# Patient Record
Sex: FEMALE | Race: WHITE | NOT HISPANIC OR LATINO | Employment: FULL TIME | URBAN - METROPOLITAN AREA
[De-identification: names, ages, dates, MRNs, and addresses within clinical notes are randomized per-mention and may not be internally consistent; named-entity substitution may affect disease eponyms.]

---

## 2017-03-17 ENCOUNTER — TELEPHONE (OUTPATIENT)
Dept: FAMILY MEDICINE CLINIC | Facility: CLINIC | Age: 62
End: 2017-03-17

## 2017-03-17 NOTE — TELEPHONE ENCOUNTER
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

## 2017-10-10 ENCOUNTER — APPOINTMENT (OUTPATIENT)
Dept: WOMENS IMAGING | Facility: HOSPITAL | Age: 62
End: 2017-10-10

## 2017-10-10 PROCEDURE — 77063 BREAST TOMOSYNTHESIS BI: CPT | Performed by: RADIOLOGY

## 2017-10-10 PROCEDURE — 77067 SCR MAMMO BI INCL CAD: CPT | Performed by: RADIOLOGY

## 2018-01-31 ENCOUNTER — OFFICE VISIT (OUTPATIENT)
Dept: FAMILY MEDICINE CLINIC | Facility: CLINIC | Age: 63
End: 2018-01-31

## 2018-01-31 VITALS
HEIGHT: 63 IN | WEIGHT: 107 LBS | BODY MASS INDEX: 18.96 KG/M2 | OXYGEN SATURATION: 97 % | TEMPERATURE: 98.2 F | SYSTOLIC BLOOD PRESSURE: 92 MMHG | DIASTOLIC BLOOD PRESSURE: 62 MMHG | HEART RATE: 78 BPM

## 2018-01-31 DIAGNOSIS — M25.561 ACUTE PAIN OF RIGHT KNEE: Primary | ICD-10-CM

## 2018-01-31 PROCEDURE — 99213 OFFICE O/P EST LOW 20 MIN: CPT | Performed by: FAMILY MEDICINE

## 2018-01-31 PROCEDURE — 96372 THER/PROPH/DIAG INJ SC/IM: CPT | Performed by: FAMILY MEDICINE

## 2018-01-31 RX ORDER — DEXAMETHASONE SODIUM PHOSPHATE 4 MG/ML
4 INJECTION, SOLUTION INTRA-ARTICULAR; INTRALESIONAL; INTRAMUSCULAR; INTRAVENOUS; SOFT TISSUE ONCE
Status: COMPLETED | OUTPATIENT
Start: 2018-01-31 | End: 2018-01-31

## 2018-01-31 RX ORDER — BUDESONIDE AND FORMOTEROL FUMARATE DIHYDRATE 160; 4.5 UG/1; UG/1
2 AEROSOL RESPIRATORY (INHALATION)
Qty: 10.2 G | Refills: 5 | Status: SHIPPED | OUTPATIENT
Start: 2018-01-31 | End: 2019-02-19 | Stop reason: SDUPTHER

## 2018-01-31 RX ADMIN — DEXAMETHASONE SODIUM PHOSPHATE 4 MG: 4 INJECTION, SOLUTION INTRA-ARTICULAR; INTRALESIONAL; INTRAMUSCULAR; INTRAVENOUS; SOFT TISSUE at 13:39

## 2018-01-31 NOTE — PATIENT INSTRUCTIONS
This is a very nice 62-year-old who has acute right knee pain.  I will request an MRI and notify her when the results are available.  I would like her to call if there is a problem.
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
## 2018-02-05 ENCOUNTER — HOSPITAL ENCOUNTER (OUTPATIENT)
Dept: MRI IMAGING | Facility: HOSPITAL | Age: 63
Discharge: HOME | End: 2018-02-05
Admitting: FAMILY MEDICINE

## 2018-02-05 PROCEDURE — 73721 MRI JNT OF LWR EXTRE W/O DYE: CPT

## 2018-02-08 DIAGNOSIS — S83.206A ACUTE MENISCAL TEAR OF RIGHT KNEE, INITIAL ENCOUNTER: Primary | ICD-10-CM

## 2018-02-08 DIAGNOSIS — R93.7 BONE MARROW EDEMA: ICD-10-CM

## 2018-02-08 PROBLEM — S83.209A ACUTE MENISCAL TEAR OF KNEE: Status: ACTIVE | Noted: 2018-02-08

## 2018-02-13 ENCOUNTER — OFFICE VISIT (OUTPATIENT)
Dept: ORTHOPEDIC SURGERY | Facility: CLINIC | Age: 63
End: 2018-02-13

## 2018-02-13 VITALS — TEMPERATURE: 98.6 F | BODY MASS INDEX: 19.69 KG/M2 | HEIGHT: 62 IN | WEIGHT: 107 LBS

## 2018-02-13 DIAGNOSIS — M25.561 ACUTE PAIN OF RIGHT KNEE: Primary | ICD-10-CM

## 2018-02-13 PROCEDURE — 20610 DRAIN/INJ JOINT/BURSA W/O US: CPT | Performed by: NURSE PRACTITIONER

## 2018-02-13 PROCEDURE — 73562 X-RAY EXAM OF KNEE 3: CPT | Performed by: NURSE PRACTITIONER

## 2018-02-13 PROCEDURE — 99203 OFFICE O/P NEW LOW 30 MIN: CPT | Performed by: NURSE PRACTITIONER

## 2018-02-13 RX ORDER — BUPIVACAINE HYDROCHLORIDE 5 MG/ML
2 INJECTION, SOLUTION EPIDURAL; INTRACAUDAL
Status: COMPLETED | OUTPATIENT
Start: 2018-02-13 | End: 2018-02-13

## 2018-02-13 RX ORDER — METHYLPREDNISOLONE ACETATE 80 MG/ML
80 INJECTION, SUSPENSION INTRA-ARTICULAR; INTRALESIONAL; INTRAMUSCULAR; SOFT TISSUE
Status: COMPLETED | OUTPATIENT
Start: 2018-02-13 | End: 2018-02-13

## 2018-02-13 RX ADMIN — METHYLPREDNISOLONE ACETATE 80 MG: 80 INJECTION, SUSPENSION INTRA-ARTICULAR; INTRALESIONAL; INTRAMUSCULAR; SOFT TISSUE at 15:56

## 2018-02-13 RX ADMIN — BUPIVACAINE HYDROCHLORIDE 2 ML: 5 INJECTION, SOLUTION EPIDURAL; INTRACAUDAL at 15:56
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
## 2018-02-15 ENCOUNTER — TELEPHONE (OUTPATIENT)
Dept: ORTHOPEDIC SURGERY | Facility: CLINIC | Age: 63
End: 2018-02-15

## 2018-02-15 NOTE — TELEPHONE ENCOUNTER
Would recommend continued ice, elevation, NSAIDS/Tylenol prn--follow up if it doesn't improve over the next several days

## 2018-02-16 ENCOUNTER — HOSPITAL ENCOUNTER (OUTPATIENT)
Dept: PHYSICAL THERAPY | Facility: HOSPITAL | Age: 63
Setting detail: THERAPIES SERIES
Discharge: HOME | End: 2018-02-16

## 2018-02-16 DIAGNOSIS — Z74.09 IMPAIRED MOBILITY: ICD-10-CM

## 2018-02-16 DIAGNOSIS — M25.561 RIGHT KNEE PAIN, UNSPECIFIED CHRONICITY: Primary | ICD-10-CM

## 2018-02-16 PROCEDURE — 97110 THERAPEUTIC EXERCISES: CPT | Performed by: PHYSICAL THERAPIST

## 2018-02-16 PROCEDURE — 97161 PT EVAL LOW COMPLEX 20 MIN: CPT | Performed by: PHYSICAL THERAPIST
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
## 2018-02-21 ENCOUNTER — HOSPITAL ENCOUNTER (OUTPATIENT)
Dept: PHYSICAL THERAPY | Facility: HOSPITAL | Age: 63
Setting detail: THERAPIES SERIES
Discharge: HOME | End: 2018-02-21

## 2018-02-21 DIAGNOSIS — M25.561 RIGHT KNEE PAIN, UNSPECIFIED CHRONICITY: Primary | ICD-10-CM

## 2018-02-21 DIAGNOSIS — Z74.09 IMPAIRED MOBILITY: ICD-10-CM

## 2018-02-21 PROCEDURE — 97110 THERAPEUTIC EXERCISES: CPT

## 2018-02-21 NOTE — THERAPY TREATMENT NOTE
Outpatient Physical Therapy Ortho Treatment Note  [facility]     Patient Name: Rafia Taylor  : 1955  MRN: [id number]  Today's Date: 2018      Visit Date: 2018    Visit Dx:    ICD-10-CM ICD-9-CM   1. Right knee pain, unspecified chronicity M25.561 719.46   2. Impaired mobility Z74.09 799.89       Patient Active Problem List   Diagnosis   • Insect bite of neck, infected   • Anxiety   • Carpal tunnel syndrome   • Strain of neck muscle   • Low back pain   • Paresthesia   • Annual physical exam   • Acute pain of right knee   • Acute meniscal tear of knee   • Bone marrow edema        No past medical history on file.     No past surgical history on file.                          PT Assessment/Plan       18 8504       PT Assessment    Assessment Comments Pt returns for initial follow up after evaluation and reports decreased symptoms with HEP. Pt reports increased pain today and antalgia noted with gait. Performed recumbent bike warm up and pt with questions regarding resuming recumbent bike at home. Advised pt to perform for 5 min only and stop if pain begins. May be able to progress pending symptoms with bike and HEP. Added SLR, LAQ, SAQ and hip flexor stretch today which pt was able to perform with light ankle weight and no pain. Pt with many questions regarding returning to normal recreational activities including yoga. Advised pt to avoid any activites which place torque on the knee including several yoga poses. Pt verbalizes understanding.   -CN     PT Plan    PT Plan Comments Assess response to added exercise and progress if able. Consider adding weights to HS curls, assess response to recreational activities.   -CN       User Key  (r) = Recorded By, (t) = Taken By, (c) = Cosigned By    Initials Name Provider Type    DADA Aguirre, PT Physical Therapist                    Exercises       18 1700          Subjective Comments    Subjective Comments It has been  hurting a little more today and I can tell that I have been limping more. The exercises help a lot though. The next day I usually feel better.   -CN      Subjective Pain    Able to rate subjective pain? yes  -CN      Pre-Treatment Pain Level 6  -CN      Exercise 4    Exercise Name 4 quad set  -CN      Cueing 4 Demo  -CN      Reps 4 15  -CN      Time (Seconds) 4 5  -CN      Exercise 9    Exercise Name 9 SLR with quad set  -CN      Cueing 9 Verbal  -CN      Sets 9 2  -CN      Reps 9 10  -CN      Additional Comments 2#  -CN      Exercise 10    Exercise Name 10 Recumbent bike, seat 4  -CN      Cueing 10 Verbal  -CN      Time (Minutes) 10 5  -CN      Exercise 11    Exercise Name 11 SLR with quad set and ER  -CN      Cueing 11 Verbal  -CN      Sets 11 2  -CN      Reps 11 10  -CN      Exercise 12    Exercise Name 12 SAQ  -CN      Cueing 12 Demo  -CN      Sets 12 2  -CN      Reps 12 10  -CN      Additional Comments 2#  -CN      Exercise 13    Exercise Name 13 LAQ with ball squeeze  -CN      Cueing 13 Demo  -CN      Sets 13 2  -CN      Reps 13 10  -CN      Additional Comments 2#  -CN      Exercise 14    Exercise Name 14 Hip flexor stretch at stairs  -CN      Cueing 14 Demo  -CN      Reps 14 3  -CN      Time (Seconds) 14 20  -CN        User Key  (r) = Recorded By, (t) = Taken By, (c) = Cosigned By    Initials Name Provider Type    CN Naida Aguirre, PT Physical Therapist                               PT OP Goals       02/21/18 1800       PT Short Term Goals    STG Date to Achieve 03/16/18  -CN     STG 1 pt. to be I with initial HEP to facilitate self management of their condition  -CN     STG 1 Progress Ongoing  -CN     STG 1 Progress Comments Pt compliant with initial HEP, progressed today.   -CN     STG 2 pt. to be educated in/verbalize understanding of the importance of posture/ergonomics in association with their condition to facilitate self management of their condition  -CN     STG 2 Progress Ongoing  -CN      STG 3 pt to demosntrate near normal heel to toe gait pattern to facilitate ease/safety with household/community mobility.  -CN     STG 3 Progress Ongoing  -CN     STG 3 Progress Comments Pt with antalgia noted during gait today.   -CN     Long Term Goals    LTG Date to Achieve 05/17/18  -CN     LTG 1 pt. to be I with advanced HEP to facilitate self management of their condition  -CN     LTG 1 Progress Ongoing  -CN     LTG 2 pt. to report a KOS score >/= 70 to demonstrate decreased level of perceived disability  -CN     LTG 2 Progress Ongoing  -CN     LTG 3 pt. to ascend/descends stairs with reciprocal pattern (</= 1 rails) to facilitate ease/safety of household/community mobility  -CN     LTG 3 Progress Ongoing  -CN     LTG 4 pt. 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)  -CN     LTG 4 Progress Ongoing  -CN       User Key  (r) = Recorded By, (t) = Taken By, (c) = Cosigned By    Initials Name Provider Type    DADA Aguirre, PT Physical Therapist          Therapy Education  Given: HEP, Symptoms/condition management, Pain management, Posture/body mechanics, Mobility training, Edema management  Program: Reinforced  How Provided: Verbal, Demonstration, Written  Provided to: Patient  Level of Understanding: Teach back education performed, Verbalized, Demonstrated              Time Calculation:   Start Time: 1715  Stop Time: 1800  Time Calculation (min): 45 min    Therapy Charges for Today     Code Description Service Date Service Provider Modifiers Qty    11386658077 HC PT THER PROC EA 15 MIN 2/21/2018 Naida Aguirre, PT GP 3                    Naida Aguirre, PT  2/21/2018

## 2018-02-26 ENCOUNTER — HOSPITAL ENCOUNTER (OUTPATIENT)
Dept: PHYSICAL THERAPY | Facility: HOSPITAL | Age: 63
Setting detail: THERAPIES SERIES
Discharge: HOME | End: 2018-02-26

## 2018-02-26 DIAGNOSIS — M25.561 RIGHT KNEE PAIN, UNSPECIFIED CHRONICITY: Primary | ICD-10-CM

## 2018-02-26 DIAGNOSIS — Z74.09 IMPAIRED MOBILITY: ICD-10-CM

## 2018-02-26 PROCEDURE — 97110 THERAPEUTIC EXERCISES: CPT | Performed by: PHYSICAL THERAPIST
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
## 2018-03-01 ENCOUNTER — HOSPITAL ENCOUNTER (OUTPATIENT)
Dept: PHYSICAL THERAPY | Facility: HOSPITAL | Age: 63
Setting detail: THERAPIES SERIES
Discharge: HOME | End: 2018-03-01

## 2018-03-01 DIAGNOSIS — M25.561 RIGHT KNEE PAIN, UNSPECIFIED CHRONICITY: Primary | ICD-10-CM

## 2018-03-01 DIAGNOSIS — Z74.09 IMPAIRED MOBILITY: ICD-10-CM

## 2018-03-01 PROCEDURE — 97110 THERAPEUTIC EXERCISES: CPT

## 2018-03-01 NOTE — THERAPY TREATMENT NOTE
Outpatient Physical Therapy Ortho Treatment Note  [facility]     Patient Name: Rafia Taylor  : 1955  MRN: [id number]  Today's Date: 3/1/2018      Visit Date: 2018    Visit Dx:    ICD-10-CM ICD-9-CM   1. Right knee pain, unspecified chronicity M25.561 719.46   2. Impaired mobility Z74.09 799.89       Patient Active Problem List   Diagnosis   • Insect bite of neck, infected   • Anxiety   • Carpal tunnel syndrome   • Strain of neck muscle   • Low back pain   • Paresthesia   • Annual physical exam   • Acute pain of right knee   • Acute meniscal tear of knee   • Bone marrow edema        No past medical history on file.     No past surgical history on file.                          PT Assessment/Plan       18 1820       PT Assessment    Assessment Comments Continue to progress strengthening. Exercise every other day. continue ice. Discussed ice massage  -       User Key  (r) = Recorded By, (t) = Taken By, (c) = Cosigned By    Initials Name Provider Type    YULI Spann PTA Physical Therapy Assistant                Modalities       18 1715          Ice    Ice Applied Yes  -      Location R knee,pt. supine with RLE elevated over pillows  -      Rx Minutes 10 mins  -      Ice S/P Rx Yes  -        User Key  (r) = Recorded By, (t) = Taken By, (c) = Cosigned By    Initials Name Provider Type    YULI Spann PTA Physical Therapy Assistant                Exercises       18 1715          Subjective Comments    Subjective Comments I carried a sleeping 4 year old today and it aggravated the knee. Been using ice. Not doing the exercises as told.    -      Subjective Pain    Able to rate subjective pain? yes  -      Pre-Treatment Pain Level 6  -      Exercise 1    Exercise Name 1 HR  -      Cueing 1 Demo  -      Reps 1 20  -WS      Additional Comments on steps  -      Exercise 2    Exercise Name 2 HS curl, standing  -      Cueing 2 Verbal  -      Reps  2 20  -WS      Additional Comments 2#  -WS      Exercise 3    Exercise Name 3 SL hip abd  -WS      Cueing 3 Verbal  -WS      Reps 3 20  -WS      Additional Comments 2#  -WS      Exercise 4    Exercise Name 4 quad set  -WS      Cueing 4 Demo  -WS      Reps 4 15  -WS      Time (Seconds) 4 5  -WS      Exercise 5    Exercise Name 5 steated TKE into ball  -WS      Cueing 5 Verbal  -WS      Reps 5 15  -WS      Time (Seconds) 5 5  -WS      Exercise 6    Exercise Name 6 HS stretch, sitting Edge Of Chair  -WS      Cueing 6 Demo  -WS      Reps 6 3  -WS      Time (Seconds) 6 20  -WS      Exercise 7    Exercise Name 7 gastroc stretch at step  -WS      Cueing 7 Demo  -WS      Reps 7 3  -WS      Time (Seconds) 7 20  -WS      Exercise 9    Exercise Name 9 SLR with quad set  -WS      Cueing 9 Verbal  -WS      Sets 9 2  -WS      Reps 9 10  -WS      Additional Comments 2#  -WS      Exercise 10    Exercise Name 10 Recumbent bike, seat 4  -WS      Time (Minutes) 10 5  -WS      Exercise 11    Exercise Name 11 SLR with quad set and ER  -WS      Cueing 11 Verbal  -WS      Sets 11 2  -WS      Reps 11 10  -WS      Exercise 12    Exercise Name 12 SAQ  -WS      Cueing 12 Demo  -WS      Sets 12 2  -WS      Reps 12 10  -WS      Additional Comments 4#  -WS      Exercise 13    Exercise Name 13 LAQ with ball squeeze  -WS      Cueing 13 Demo  -WS      Sets 13 2  -WS      Reps 13 10  -WS      Additional Comments 4#  -WS      Exercise 14    Exercise Name 14 Hip flexor stretch at stairs  -WS      Cueing 14 Demo  -WS      Reps 14 3  -WS      Time (Seconds) 14 20  -WS        User Key  (r) = Recorded By, (t) = Taken By, (c) = Cosigned By    Initials Name Provider Type    YULI Spann PTA Physical Therapy Assistant                               PT OP Goals       03/01/18 1800       PT Short Term Goals    STG Date to Achieve 03/16/18  -WS     STG 1 pt. to be I with initial HEP to facilitate self management of their condition  -WS     STG 1 Progress  Met  -     STG 2 pt. to be educated in/verbalize understanding of the importance of posture/ergonomics in association with their condition to facilitate self management of their condition  -WS     STG 2 Progress Ongoing  -     STG 3 pt to demosntrate near normal heel to toe gait pattern to facilitate ease/safety with household/community mobility.  -WS     STG 3 Progress Ongoing  -     Long Term Goals    LTG Date to Achieve 05/17/18  -     LTG 1 pt. to be I with advanced HEP to facilitate self management of their condition  -WS     LTG 1 Progress Ongoing  -     LTG 2 pt. to report a KOS score >/= 70 to demonstrate decreased level of perceived disability  -WS     LTG 2 Progress Ongoing  -     LTG 3 pt. to ascend/descends stairs with reciprocal pattern (</= 1 rails) to facilitate ease/safety of household/community mobility  -WS     LTG 3 Progress Ongoing  -     LTG 4 pt. 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)  -     LTG 4 Progress Ongoing  [facility]       User Key  (r) = Recorded By, (t) = Taken By, (c) = Cosigned By    Initials Name Provider Type    YULI Spann PTA Physical Therapy Assistant          Therapy Education  Given: HEP, Symptoms/condition management, Pain management, Edema management  Program: Reinforced  How Provided: Verbal  Provided to: Patient              Time Calculation:   Start Time: 1745  Stop Time: 1840  Time Calculation (min): 55 min    Therapy Charges for Today     Code Description Service Date Service Provider Modifiers Qty    12178172544 HC PT THER PROC EA 15 MIN 3/1/2018 Arden Spann PTA GP 3    82156628217 HC PT HOT OR COLD PACK TREAT [facility] 3/1/2018 Arden Spann PTA GP 1                    Arden Spann PTA  3/1/2018

## 2018-03-06 ENCOUNTER — HOSPITAL ENCOUNTER (OUTPATIENT)
Dept: PHYSICAL THERAPY | Facility: HOSPITAL | Age: 63
Setting detail: THERAPIES SERIES
Discharge: HOME | End: 2018-03-06

## 2018-03-06 DIAGNOSIS — M25.561 RIGHT KNEE PAIN, UNSPECIFIED CHRONICITY: Primary | ICD-10-CM

## 2018-03-06 DIAGNOSIS — Z74.09 IMPAIRED MOBILITY: ICD-10-CM

## 2018-03-06 PROCEDURE — 97110 THERAPEUTIC EXERCISES: CPT | Performed by: PHYSICAL THERAPIST

## 2018-03-06 NOTE — THERAPY TREATMENT NOTE
Outpatient Physical Therapy Ortho Treatment Note  [facility]     Patient Name: Rafia Taylor  : 1955  MRN: [id number]  Today's Date: 3/6/2018      Visit Date: 2018    Visit Dx:    ICD-10-CM ICD-9-CM   1. Right knee pain, unspecified chronicity M25.561 719.46   2. Impaired mobility Z74.09 799.89       Patient Active Problem List   Diagnosis   • Insect bite of neck, infected   • Anxiety   • Carpal tunnel syndrome   • Strain of neck muscle   • Low back pain   • Paresthesia   • Annual physical exam   • Acute pain of right knee   • Acute meniscal tear of knee   • Bone marrow edema        No past medical history on file.     No past surgical history on file.                          PT Assessment/Plan       18 07       PT Assessment    Assessment Comments Ms Taylor reports increased pain over the weekend.  She is emotional today re: possibility that her knee pain is going to affect the status of her job.  I encouraged her to discuss this with her employer.  We changed seated TKE to leg press with RLE only and added SLS with shoulder ext today.  She demontrates improved gait mechanics with near normal heel to toe gait pattern, slight R knee flexion in stance phase.    -GJ     PT Plan    PT Plan Comments assess  response to leg press, continue to work on RLEstrengthening.   -GJ       User Key  (r) = Recorded By, (t) = Taken By, (c) = Cosigned By    Initials Name Provider Type    WYATT Raman, PT Physical Therapist                Modalities       18 0700          Ice    Ice Applied Yes  -GJ      Location ice cup to R pes anserine tissue followed by ice pack to R knee,pt. supine with RLE elevated over pillows  -GJ      Rx Minutes 10 mins  -GJ      Ice S/P Rx Yes  -GJ        User Key  (r) = Recorded By, (t) = Taken By, (c) = Cosigned By    Initials Name Provider Type    WYATT Raman PT Physical Therapist                Exercises       18 0700          Subjective Comments     Subjective Comments my knee hurt all day saturday, sunday and monday. I woke up with a 7/10, no 4/10 pain.  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   -GJ      Subjective Pain    Pre-Treatment Pain Level 4  -GJ      Exercise 1    Exercise Name 1 HR  -GJ      Cueing 1 Verbal  -GJ      Reps 1 20  -GJ      Additional Comments steps  -GJ      Exercise 2    Exercise Name 2 HS curl, standing  -GJ      Cueing 2 Verbal  -GJ      Reps 2 20  -GJ      Additional Comments 3#  -GJ      Exercise 3    Exercise Name 3 SL hip abd  -GJ      Cueing 3 Verbal  -GJ      Reps 3 20  -GJ      Additional Comments 2#  -GJ      Exercise 4    Exercise Name 4 quad set  -GJ      Cueing 4 Verbal  -GJ      Reps 4 15  -GJ      Time (Seconds) 4 5  -GJ      Exercise 5    Exercise Name 5 leg press, RLE only  -GJ      Cueing 5 Demo  -GJ      Reps 5 15  -GJ      Additional Comments 60#  -GJ      Exercise 6    Exercise Name 6 HS stretch, sitting Edge Of Chair  -GJ      Cueing 6 Verbal  -GJ      Reps 6 3  -GJ      Time (Seconds) 6 20  -GJ      Exercise 7    Exercise Name 7 gastroc stretch at step  -GJ      Cueing 7 Demo  -GJ      Reps 7 3  -GJ      Time (Seconds) 7 20  -GJ      Exercise 9    Exercise Name 9 SLR with quad set  -GJ      Cueing 9 Verbal  -GJ      Sets 9 2  -GJ      Reps 9 10  -GJ      Additional Comments 2#  -GJ      Exercise 10    Exercise Name 10 Recumbent bike, seat 4  -GJ      Time (Minutes) 10 5  -GJ      Exercise 11    Exercise Name 11 SLR with quad set and ER  -GJ      Cueing 11 Verbal  -GJ      Sets 11 2  -GJ      Reps 11 10  -GJ      Exercise 12    Exercise Name 12 SAQ  -GJ      Cueing 12 Verbal  -GJ      Sets 12 2  -GJ      Reps 12 10  -GJ      Additional Comments 5#  -GJ      Exercise 13    Exercise Name 13 LAQ with ball squeeze  -GJ      Cueing 13 Verbal  -GJ      Sets 13 2  -GJ      Reps 13 10  -GJ      Additional Comments 5#  -GJ      Exercise 14    Exercise Name 14 Hip flexor stretch at stairs  -GJ      Cueing 14 Verbal  -GJ       Reps 14 3  -GJ      Time (Seconds) 14 20  -GJ      Exercise 15    Exercise Name 15 SLS shoulder ext  -GJ      Cueing 15 Demo  -GJ      Reps 15 12  -GJ      Additional Comments RTB  -GJ        User Key  (r) = Recorded By, (t) = Taken By, (c) = Cosigned By    Initials Name Provider Type    GJ Rick Raman, PT Physical Therapist                               PT OP Goals       03/06/18 0700       PT Short Term Goals    STG Date to Achieve 03/16/18  -GJ     STG 1 pt. to be I with initial HEP to facilitate self management of their condition  -GJ     STG 1 Progress Met  -GJ     STG 2 pt. to be educated in/verbalize understanding of the importance of posture/ergonomics in association with their condition to facilitate self management of their condition  -GJ     STG 2 Progress Met  -GJ     STG 3 pt to demosntrate near normal heel to toe gait pattern to facilitate ease/safety with household/community mobility.  -GJ     STG 3 Progress Partially Met  -GJ     STG 3 Progress Comments slight R knee flexion during stance phase  -GJ     Long Term Goals    LTG Date to Achieve 05/17/18  -GJ     LTG 1 pt. to be I with advanced HEP to facilitate self management of their condition  -GJ     LTG 1 Progress Ongoing  -GJ     LTG 2 pt. to report a KOS score >/= 70 to demonstrate decreased level of perceived disability  -GJ     LTG 2 Progress Ongoing  -GJ     LTG 3 pt. to ascend/descends stairs with reciprocal pattern (</= 1 rails) to facilitate ease/safety of household/community mobility  -GJ     LTG 3 Progress Partially Met  -GJ     LTG 3 Progress Comments asced/descend with reciprocal pattern, 0-1 rail, slight hesitation with descendig   -GJ     LTG 4 pt. 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  Ice cup at home only 4-5 min vs. 10-15, ice pack only  10-15min. not 30.    Given: HEP, Symptoms/condition management, Pain management, Posture/body mechanics, Mobility training, Edema management  Program: New  How Provided: Verbal, Demonstration  Provided to: Patient  Level of Understanding: Teach back education performed, Verbalized, Demonstrated              Time Calculation:   Start Time: 0704  Stop Time: 0757  Time Calculation (min): 53 min    Therapy Charges for Today     Code Description Service Date Service Provider Modifiers Qty    72403648687 HC PT THER PROC EA 15 MIN 3/6/2018 Rick Raman, PT GP 3    [id number]  PT HOT OR COLD PACK TREAT MCARE 3/6/2018 Rick Raman, PT GP 1                    Rick Raman, PT  3/6/2018

## 2018-03-08 ENCOUNTER — HOSPITAL ENCOUNTER (OUTPATIENT)
Dept: PHYSICAL THERAPY | Facility: HOSPITAL | Age: 63
Setting detail: THERAPIES SERIES
End: 2018-03-08

## 2018-03-12 ENCOUNTER — HOSPITAL ENCOUNTER (OUTPATIENT)
Dept: PHYSICAL THERAPY | Facility: HOSPITAL | Age: 63
Setting detail: THERAPIES SERIES
Discharge: HOME | End: 2018-03-12

## 2018-03-12 DIAGNOSIS — Z74.09 IMPAIRED MOBILITY: ICD-10-CM

## 2018-03-12 DIAGNOSIS — M25.561 RIGHT KNEE PAIN, UNSPECIFIED CHRONICITY: Primary | ICD-10-CM

## 2018-03-12 PROCEDURE — 97110 THERAPEUTIC EXERCISES: CPT | Performed by: PHYSICAL THERAPIST

## 2018-03-12 PROCEDURE — 97035 APP MDLTY 1+ULTRASOUND EA 15: CPT | Performed by: PHYSICAL THERAPIST

## 2018-03-12 NOTE — THERAPY TREATMENT NOTE
Outpatient Physical Therapy Ortho Treatment Note  [facility]     Patient Name: Rafia Taylor  : 1955  MRN: [id number]  Today's Date: 3/12/2018      Visit Date: 2018    Visit Dx:    ICD-10-CM ICD-9-CM   1. Right knee pain, unspecified chronicity M25.561 719.46   2. Impaired mobility Z74.09 799.89       Patient Active Problem List   Diagnosis   • Insect bite of neck, infected   • Anxiety   • Carpal tunnel syndrome   • Strain of neck muscle   • Low back pain   • Paresthesia   • Annual physical exam   • Acute pain of right knee   • Acute meniscal tear of knee   • Bone marrow edema        No past medical history on file.     No past surgical history on file.                          PT Assessment/Plan     Row Name 18 0724          PT Assessment    Functional Limitations Impaired gait;Limitation in home management;Performance in self-care ADL;Performance in leisure activities;Limitations in community activities;Performance in work activities  -GJ     Impairments Balance;Gait;Impaired flexibility;Muscle strength;Pain  -GJ     Assessment Comments Ms. Taylor continues to report AM pain. We reviewed activity modifications and realistic expectations including time frames with therapy.  Initaited trial of US to R pes anserine today. She is to return to [place] mid to late April.  She requires decreasing vc with exercise progression.   -GJ        PT Plan    PT Plan Comments continue to strengthen RLE, assess response to US of R pes anserine, continue if benefiial.    -GJ       User Key  (r) = Recorded By, (t) = Taken By, (c) = Cosigned By    Initials Name Provider Type     Rick Raman, PT Physical Therapist                Modalities     Row Name 18 0700             Ice    Ice Applied Yes  -GJ      Location ice pack to R knee,pt. supine  -GJ      Rx Minutes 10 mins  -GJ      Ice S/P Rx Yes  -GJ         Ultrasound [id number]    Location R ney queenerine, pt. supine  -GJ      Rx Minutes 8  -GJ      Duty  Cycle 100  -GJ      Frequency 1.0 MHz  -GJ      Intensity - Wts/cm 1.5  -GJ      Phonophoresis No  -GJ        User Key  (r) = Recorded By, (t) = Taken By, (c) = Cosigned By    Initials Name Provider Type    GJ Rick Raman, PT Physical Therapist                Exercises     Row Name 03/12/18 0700             Subjective Comments    Subjective Comments It (my knee) hurts so bad in the morning.  I guess I notice some small improvements during the day. Can I start to cycle. I did havve a big weekend with shopping and cleaning  -GJ         Subjective Pain    Pre-Treatment Pain Level 5  -GJ      Subjective Pain Comment out of be 7/10, now 5/10  -GJ         Exercise 1    Exercise Name 1 HR  -GJ      Cueing 1 Verbal  -GJ      Reps 1 20  -GJ      Additional Comments on steps  -GJ         Exercise 2    Exercise Name 2 HS curl, standing  -GJ      Cueing 2 Verbal  -GJ      Reps 2 20  -GJ      Additional Comments 3#  -GJ         Exercise 3    Exercise Name 3 SL hip abd  -GJ      Cueing 3 Verbal  -GJ      Reps 3 20  -GJ      Additional Comments 2#  -GJ         Exercise 4    Exercise Name 4 quad set  -GJ      Cueing 4 Verbal  -GJ      Reps 4 15  -GJ      Time (Seconds) 4 5  -GJ         Exercise 5    Exercise Name 5 leg press, RLE only  -GJ      Reps 5 20  -GJ      Additional Comments 60#  -GJ         Exercise 6    Exercise Name 6 HS stretch, sitting Edge Of Chair  -GJ      Reps 6 3  -GJ      Cueing 6 Verbal  -GJ      Time (Seconds) 6 20  -GJ         Exercise 7    Exercise Name 7 gastroc stretch at step  -GJ      Reps 7 3  -GJ      Cueing 7 Verbal  -GJ      Time (Seconds) 7 20  -GJ         Exercise 9    Exercise Name 9 SLR with quad set  -GJ      Reps 9 20  -GJ      Additional Comments 2#  -GJ      Cueing 9 Verbal  -GJ         Exercise 10    Exercise Name 10 Recumbent bike, seat 4  -GJ      Time 10 5  -GJ         Exercise 11    Exercise Name 11 SLR with quad set and ER  -GJ      Reps 11 20  -GJ      Cueing 11 Verbal  -GJ          Exercise 12    Exercise Name 12 SAQ  -GJ      Reps 12 20  -GJ      Additional Comments 5#  -GJ      Cueing 12 Verbal  -GJ         Exercise 13    Exercise Name 13 LAQ with ball squeeze  -GJ      Additional Comments 5#  -GJ      Cueing 13 Verbal  -GJ         Exercise 14    Exercise Name 14 Hip flexor stretch at stairs  -GJ      Reps 14 3  -GJ      Cueing 14 Verbal  -GJ      Time (Seconds) 14 20  -GJ         Exercise 15    Exercise Name 15 SLS shoulder ext  -GJ      Sets 15 2  -GJ      Reps 15 10  -GJ      Additional Comments RTB  -GJ      Cueing 15 Verbal  -GJ        User Key  (r) = Recorded By, (t) = Taken By, (c) = Cosigned By    Initials Name Provider Type    GJ Rick Raman, PT Physical Therapist                               PT OP Goals     Row Name 03/12/18 0700          PT Short Term Goals    STG Date to Achieve 03/16/18  -GJ     STG 1 pt. to be I with initial HEP to facilitate self management of their condition  -GJ     STG 1 Progress Met  -GJ     STG 2 pt. to be educated in/verbalize understanding of the importance of posture/ergonomics in association with their condition to facilitate self management of their condition  -GJ     STG 2 Progress Met  -GJ     STG 3 pt to demosntrate near normal heel to toe gait pattern to facilitate ease/safety with household/community mobility.  -GJ     STG 3 Progress Partially Met  -GJ     STG 3 Progress Comments continus with mild limp  -GJ        Long Term Goals    LTG Date to Achieve 05/17/18  -GJ     LTG 1 pt. to be I with advanced HEP to facilitate self management of their condition  -GJ     LTG 1 Progress Ongoing  -GJ     LTG 2 pt. to report a KOS score >/= 70 to demonstrate decreased level of perceived disability  -GJ     LTG 2 Progress Ongoing  -GJ     LTG 3 pt. to ascend/descends stairs with reciprocal pattern (</= 1 rails) to facilitate ease/safety of household/community mobility  -GJ     LTG 3 Progress Partially Met  -GJ     LTG 4 pt. 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    Given: HEP, Symptoms/condition management, Pain management, Posture/body mechanics, Mobility training  Program: Reinforced  How Provided: Verbal  Provided to: Patient  Level of Understanding: Teach back education performed, Verbalized, Demonstrated              Time Calculation:   Start Time: 0708  Stop Time: 0801  Time Calculation (min): 53 min    Therapy Charges for Today     Code Description Service Date Service Provider Modifiers Qty    44333214815  PT HOT OR COLD PACK TREAT [facility]ARE 3/12/2018 Rick Raman, PT GP 1    08361210454 HC PT ULTRASOUND EA 15 MIN 3/12/2018 Rick Raman, PT GP 1    54679138075 HC PT THER PROC EA 15 MIN 3/12/2018 Rick Raman, PT GP 2                    Rick Raman, PT  3/12/2018

## 2018-03-21 ENCOUNTER — HOSPITAL ENCOUNTER (OUTPATIENT)
Dept: PHYSICAL THERAPY | Facility: HOSPITAL | Age: 63
Setting detail: THERAPIES SERIES
Discharge: HOME | End: 2018-03-21

## 2018-03-21 DIAGNOSIS — M25.561 RIGHT KNEE PAIN, UNSPECIFIED CHRONICITY: Primary | ICD-10-CM

## 2018-03-21 DIAGNOSIS — Z74.09 IMPAIRED MOBILITY: ICD-10-CM

## 2018-03-21 PROCEDURE — 97110 THERAPEUTIC EXERCISES: CPT

## 2018-03-21 NOTE — THERAPY TREATMENT NOTE
Outpatient Physical Therapy Ortho Treatment Note  [facility]     Patient Name: Rafia Taylor  : 1955  MRN: [id number]  Today's Date: 3/21/2018      Visit Date: 2018    Visit Dx:    ICD-10-CM ICD-9-CM   1. Right knee pain, unspecified chronicity M25.561 719.46   2. Impaired mobility Z74.09 799.89       Patient Active Problem List   Diagnosis   • Insect bite of neck, infected   • Anxiety   • Carpal tunnel syndrome   • Strain of neck muscle   • Low back pain   • Paresthesia   • Annual physical exam   • Acute pain of right knee   • Acute meniscal tear of knee   • Bone marrow edema        No past medical history on file.     No past surgical history on file.                          PT Assessment/Plan     Row Name 18 1844          PT Assessment    Assessment Comments Patient continues to have increased AM pain. Continue to review activity modification. Held US secondary to irritated knee last session. Increased weight with several exercises  -       User Key  (r) = Recorded By, (t) = Taken By, (c) = Cosigned By    Initials Name Provider Type    YULI Spann PTA Physical Therapy Assistant                Modalities     Row Name 18 6194             Ice    Location ice pack to R knee,pt. supine  -WS      Rx Minutes 10 mins  -WS      Ice S/P Rx Yes  -        User Key  (r) = Recorded By, (t) = Taken By, (c) = Cosigned By    Initials Name Provider Type    YULI Spann PTA Physical Therapy Assistant                Exercises     Row Name 18 0566             Subjective Comments    Subjective Comments Pain worse in AM. Walking better today. US seemed to aggravate knee last treatment  -         Subjective Pain    Able to rate subjective pain? yes  -WS      Pre-Treatment Pain Level 4  -WS         Exercise 1    Exercise Name 1 HR  -      Cueing 1 Verbal  -      Reps 1 20  -WS      Additional Comments on steps   -         Exercise 2    Exercise Name 2 HS curl,  standing  -WS      Cueing 2 Verbal  -WS      Reps 2 20  -WS      Additional Comments 4#  -WS         Exercise 3    Exercise Name 3 SL hip abd  -WS      Cueing 3 Verbal  -WS      Additional Comments 2#  -WS         Exercise 4    Exercise Name 4 quad set  -WS      Cueing 4 Verbal  -WS      Reps 4 15  -WS      Time (Seconds) 4 5  -WS         Exercise 5    Exercise Name 5 leg press, RLE only  -WS      Cueing 5 Demo  -WS      Reps 5 20  -WS      Additional Comments 60#  -WS         Exercise 6    Exercise Name 6 HS stretch, sitting Edge Of Chair  -WS      Reps 6 3  -WS      Cueing 6 Verbal  -WS      Time (Seconds) 6 20  -WS         Exercise 7    Exercise Name 7 gastroc stretch at step  -WS      Reps 7 3  -WS      Cueing 7 Verbal  -WS      Time (Seconds) 7 20  -WS         Exercise 9    Exercise Name 9 SLR with quad set  -WS      Reps 9 20  -WS      Additional Comments 2#  -WS      Cueing 9 Verbal  -WS         Exercise 10    Exercise Name 10 Recumbent bike, seat 4  -WS      Time 10 5  -WS         Exercise 11    Exercise Name 11 SLR with quad set and ER  -WS      Reps 11 20  -WS      Cueing 11 Verbal  -WS         Exercise 12    Exercise Name 12 SAQ  -WS      Reps 12 20  -WS      Additional Comments 7#  -WS      Cueing 12 Verbal  -WS         Exercise 13    Exercise Name 13 LAQ with ball squeeze  -WS      Reps 13 20  -WS      Additional Comments 7#  -WS      Cueing 13 Verbal  -WS         Exercise 14    Exercise Name 14 Hip flexor stretch at stairs  -WS      Reps 14 3  -WS      Cueing 14 Verbal  -WS      Time (Seconds) 14 20  -WS         Exercise 15    Exercise Name 15 SLS shoulder ext  -WS      Sets 15 2  -WS      Reps 15 10  -WS      Additional Comments RTB  -WS      Cueing 15 Verbal  -WS        User Key  (r) = Recorded By, (t) = Taken By, (c) = Cosigned By    Initials Name Provider Type    YULI Spann PTA Physical Therapy Assistant                               PT OP Goals     Row Name 03/21/18 1800          PT  Short Term Goals    STG Date to Achieve 03/16/18  -WS     STG 1 pt. to be I with initial HEP to facilitate self management of their condition  -WS     STG 1 Progress Met  -WS     STG 2 pt. to be educated in/verbalize understanding of the importance of posture/ergonomics in association with their condition to facilitate self management of their condition  -WS     STG 2 Progress Met  -WS     STG 3 pt to demosntrate near normal heel to toe gait pattern to facilitate ease/safety with household/community mobility.  -WS     STG 3 Progress Partially Met  -WS        Long Term Goals    LTG Date to Achieve 05/17/18  -WS     LTG 1 pt. to be I with advanced HEP to facilitate self management of their condition  -WS     LTG 1 Progress Ongoing  -WS     LTG 2 pt. to report a KOS score >/= 70 to demonstrate decreased level of perceived disability  -WS     LTG 2 Progress Ongoing  -WS     LTG 3 pt. to ascend/descends stairs with reciprocal pattern (</= 1 rails) to facilitate ease/safety of household/community mobility  -WS     LTG 3 Progress Partially Met  -WS     LTG 4 pt. 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)  -     LTG 4 Progress Ongoing  -       User Key  (r) = Recorded By, (t) = Taken By, (c) = Cosigned By    Initials Name Provider Type    YULI Spann PTA Physical Therapy Assistant          Therapy Education  Given: HEP, Symptoms/condition management, Pain management  Program: Reinforced  How Provided: Verbal  Provided to: Patient              Time Calculation:   Start Time: 1740  Stop Time: 1840  Time Calculation (min): 60 min    Therapy Charges for Today     Code Description Service Date Service Provider Modifiers Qty    03991662403 HC PT THER PROC EA 15 MIN 3/21/2018 Arden Spann PTA GP 3    81131988636 HC PT HOT OR COLD PACK TREAT [facility]ARE 3/21/2018 Arden Spann PTA GP 1                    Arden Spann  PTA  3/21/2018

## 2018-03-27 ENCOUNTER — APPOINTMENT (OUTPATIENT)
Dept: PHYSICAL THERAPY | Facility: HOSPITAL | Age: 63
End: 2018-03-27

## 2018-04-02 ENCOUNTER — APPOINTMENT (OUTPATIENT)
Dept: PHYSICAL THERAPY | Facility: HOSPITAL | Age: 63
End: 2018-04-02

## 2018-05-17 ENCOUNTER — OFFICE VISIT (OUTPATIENT)
Dept: ORTHOPEDIC SURGERY | Facility: CLINIC | Age: 63
End: 2018-05-17

## 2018-05-17 VITALS — WEIGHT: 109 LBS | BODY MASS INDEX: 19.31 KG/M2 | TEMPERATURE: 98 F | HEIGHT: 63 IN

## 2018-05-17 DIAGNOSIS — S83.241D TEAR OF MEDIAL MENISCUS OF RIGHT KNEE, CURRENT, UNSPECIFIED TEAR TYPE, SUBSEQUENT ENCOUNTER: Primary | ICD-10-CM

## 2018-05-17 PROCEDURE — 99213 OFFICE O/P EST LOW 20 MIN: CPT | Performed by: ORTHOPAEDIC SURGERY

## 2018-05-17 NOTE — PROGRESS NOTES
"Patient: Rafia Taylor  YOB: 1955 62 y.o. female  Medical Record Number: [id number]    Chief Complaints:   Chief Complaint   Patient presents with   • Right Knee - Follow-up       History of Present Illness:Rafia Taylor is a 62 y.o. female who presents for follow-up of  Right knee.  She has a degenerative meniscal flap tear.  She complains of pain about the medial aspect of the knee is slightly better than last time she was here.  She had an injection and did physical therapy.  Unfortunately she's had some issues with [facility] therapy.  She reportedly had 3 visits and was charged two thousand dollars and was later told that it was out of network.  She is working on exercises on her own now.    Allergies:   Allergies   Allergen Reactions   • Amoxicillin    • Codeine    • Oxycodone    • Shellfish-Derived Products    • Sulfa Antibiotics        Medications:   Current Outpatient Prescriptions   Medication Sig Dispense Refill   • budesonide-formoterol (SYMBICORT) 160-4.5 MCG/ACT inhaler Inhale 2 puffs 2 (Two) Times a Day. 10.2 g 5     No current facility-administered medications for this visit.          The following portions of the patient's history were reviewed and updated as appropriate: allergies, current medications, past family history, past medical history, past social history, past surgical history and problem list.    Review of Systems:   A 14 point review of systems was performed. All systems negative except pertinent positives/negative listed in HPI above    Physical Exam:   Vitals:    05/17/18 1606   Temp: 98 °F (36.7 °C)   TempSrc: Temporal Artery    Weight: 49.4 kg (109 lb)   Height: 158.8 cm (62.5\")       General: A and O x 3, ASA, NAD    SCLERA:    Normal    DENTITION:   Normal  Knee:  right    ALIGNMENT:     Neutral  ,   Patella tracks   midline    GAIT:     Nonantalgic    SKIN:    No abnormality    RANGE OF MOTION:   0  -  135   DEG    STRENGTH:   5 / 5    LIGAMENTS:    No " varus / valgus instability.   Negative  Lachman.    MENISCUS:     +/-  Joo       DISTAL PULSES:    Paplable    DISTAL SENSATION :   Intact    LYMPHATICS:     No   lymphadenopathy    OTHER:          - No  effusion      - No crepitance with ROM      - No Swelling /  tenderness to palpation pes anserine bursa     Radiology:  Xrays 3views (ap,lateral, sunrise) taken previously demonstratingminimal arthritic findings  Her previous MRI was reviewed which shows a degenerative meniscal flap tear with some medial chondromalacia    Assessment/Plan:  Degenerative medial meniscus tear.  No mechanical symptoms currently.  I think she needs to continue to work therapy exercises at home.  I'm going to try and call the therapy department to determine what went wrong with her billing.  Hopefully we can rectify this terrible situation.  I want her to continue to take ibuprofen and do exercise of the next month.  If her pain worsens or if she develops any mechanical symptoms and I think it would be reasonable to consider knee arthroscopy although I explained to her there is a chance of progression of arthritis in the setting of treatment of degenerative meniscus tears

## 2018-08-24 ENCOUNTER — DOCUMENTATION (OUTPATIENT)
Dept: PHYSICAL THERAPY | Facility: HOSPITAL | Age: 63
End: 2018-08-24

## 2018-08-24 DIAGNOSIS — M25.561 RIGHT KNEE PAIN, UNSPECIFIED CHRONICITY: Primary | ICD-10-CM

## 2018-08-24 DIAGNOSIS — Z74.09 IMPAIRED MOBILITY: ICD-10-CM

## 2018-08-24 NOTE — THERAPY DISCHARGE NOTE
Outpatient Physical Therapy Discharge Summary         Patient Name: Rafia Taylor  : 1955  MRN: [id number]    Today's Date: 2018    Visit Dx:    ICD-10-CM ICD-9-CM   1. Right knee pain, unspecified chronicity M25.561 719.46   2. Impaired mobility Z74.09 799.89             PT OP Goals     Row Name 18 1000          PT Short Term Goals    STG Date to Achieve 18  -GJ     STG 1 pt. to be I with initial HEP to facilitate self management of their condition  -GJ     STG 1 Progress Met  -GJ     STG 2 pt. to be educated in/verbalize understanding of the importance of posture/ergonomics in association with their condition to facilitate self management of their condition  -GJ     STG 2 Progress Met  -GJ     STG 3 pt to demosntrate near normal heel to toe gait pattern to facilitate ease/safety with household/community mobility.  -GJ     STG 3 Progress Partially Met  -GJ        Long Term Goals    LTG Date to Achieve 18  -GJ     LTG 1 pt. to be I with advanced HEP to facilitate self management of their condition  -GJ     LTG 1 Progress Partially Met  -GJ     LTG 2 pt. to report a KOS score >/= 70 to demonstrate decreased level of perceived disability  -GJ     LTG 2 Progress Not Met  -GJ     LTG 3 pt. to ascend/descends stairs with reciprocal pattern (</= 1 rails) to facilitate ease/safety of household/community mobility  -GJ     LTG 3 Progress Partially Met  -GJ     LTG 4 pt. 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 Taylor attended 7 sessions of phyisal therpya from 2/16/18-3/21/18 for her R knee pain.  She did not return after 3/21/18 visit.  At the time of her last visit, she was independent with an initial HEP and reported some improvement in her knee pain, however still present.  Ms. Taylor is discharged from outpatient physical therapy secondary to not returning.       Time Calculation:        Therapy Suggested Charges     Code   Minutes Charges    None                       Rick Raman, PT  8/24/2018

## 2019-02-19 RX ORDER — BUDESONIDE AND FORMOTEROL FUMARATE DIHYDRATE 160; 4.5 UG/1; UG/1
2 AEROSOL RESPIRATORY (INHALATION)
Qty: 10.2 G | Refills: 0 | Status: SHIPPED | OUTPATIENT
Start: 2019-02-19 | End: 2019-03-22 | Stop reason: SDUPTHER

## 2019-03-22 RX ORDER — BUDESONIDE AND FORMOTEROL FUMARATE DIHYDRATE 160; 4.5 UG/1; UG/1
AEROSOL RESPIRATORY (INHALATION)
Qty: 10.2 G | Refills: 0 | Status: SHIPPED | OUTPATIENT
Start: 2019-03-22